# Patient Record
Sex: FEMALE | Race: WHITE | NOT HISPANIC OR LATINO | Employment: OTHER | URBAN - METROPOLITAN AREA
[De-identification: names, ages, dates, MRNs, and addresses within clinical notes are randomized per-mention and may not be internally consistent; named-entity substitution may affect disease eponyms.]

---

## 2017-01-24 NOTE — PATIENT DISCUSSION
1.  Continue with current treatment plan. Discussed importance of compliance. Will continue to monitor. 2.  Transplant Doing well after transplant. Continue topical steroids. Rejection and vaccinations reviewed. 3. Aphakia OS - stable4. Return for an appointment in 3 months for office call. with Dr. Jose Herring. 5.  1.  Transplant Doing well after transplant. Continue topical steroids. Rejection and vaccinations reviewed. 2. Primary Open Angle Glaucoma OS - Continue with current treatment plan. Discussed importance of compliance. Will continue to monitor. 3.  Aphakia OS - 4. Return for an appointment in 3 months for office call. with Dr. Jose Herring.

## 2017-04-25 NOTE — PATIENT DISCUSSION
1.  Discussed nodules recommend SK to remove order replacement BCL. Risks and benefits discussed including infection. 2. Transplant [facility] clean and disinfect CL3. Primary Open Angle Glaucoma OS - Continue with current treatment plan. Discussed importance of compliance. Will continue to monitor. Return for an appointment in 1 month for Minor Procedure. with Dr. Jeannie Linares. (will also be replacing BCL at that time this is a special order lens. )

## 2017-05-15 NOTE — PATIENT DISCUSSION
1. BCL placed on affected eye. Good fit. 2. SK performed without difficulty. POST op drops and instructions reviewedReturn for an appointment in 1 week for post op exam. with Dr. Lary Bray. 3.  OS CL information: [employer] 8.60 SPH PLANO DIAM 16. 0

## 2017-05-16 ENCOUNTER — FOLLOW UP (OUTPATIENT)
Dept: URBAN - METROPOLITAN AREA CLINIC 26 | Facility: CLINIC | Age: 67
End: 2017-05-16

## 2017-05-16 VITALS — DIASTOLIC BLOOD PRESSURE: 90 MMHG | SYSTOLIC BLOOD PRESSURE: 150 MMHG | HEIGHT: 55 IN | HEART RATE: 64 BPM

## 2017-05-16 DIAGNOSIS — H43.812: ICD-10-CM

## 2017-05-16 DIAGNOSIS — H33.001: ICD-10-CM

## 2017-05-16 DIAGNOSIS — H34.8312: ICD-10-CM

## 2017-05-16 DIAGNOSIS — H18.51: ICD-10-CM

## 2017-05-16 DIAGNOSIS — H44.23: ICD-10-CM

## 2017-05-16 DIAGNOSIS — H35.372: ICD-10-CM

## 2017-05-16 DIAGNOSIS — H25.13: ICD-10-CM

## 2017-05-16 DIAGNOSIS — H35.412: ICD-10-CM

## 2017-05-16 PROCEDURE — 1036F TOBACCO NON-USER: CPT

## 2017-05-16 PROCEDURE — 92014 COMPRE OPH EXAM EST PT 1/>: CPT

## 2017-05-16 PROCEDURE — G8427 DOCREV CUR MEDS BY ELIG CLIN: HCPCS

## 2017-05-16 PROCEDURE — G8417 CALC BMI ABV UP PARAM F/U: HCPCS

## 2017-05-16 PROCEDURE — 92250 FUNDUS PHOTOGRAPHY W/I&R: CPT

## 2017-05-16 ASSESSMENT — TONOMETRY
OS_IOP_MMHG: 13
OD_IOP_MMHG: 14

## 2017-05-16 ASSESSMENT — VISUAL ACUITY
OD_CC: 20/300
OS_CC: 20/40-2
OD_PH: 20/100

## 2017-05-22 NOTE — PATIENT DISCUSSION
Post Operative doing better. s/p SK  Polytrim qd PRED 3x/d other drops the same. Return for an appointment in 1 month for post op exam. with Dr. Rosita Calzada.

## 2017-06-23 NOTE — PATIENT DISCUSSION
Post Operative s/p SK doing well. Decrease PRED to 2x/d. Other drops the sameReturn for an appointment in 3 months for office call. clean CL with Dr. Parvin Castro.

## 2017-09-22 NOTE — PATIENT DISCUSSION
1.  Transplant Doing well after transplant. Continue topical steroids. Rejection and vaccinations reviewed. [facility] BCL clean. 2. Primary Open Angle Glaucoma OS - Continue with current treatment plan. Discussed importance of compliance. Will continue to monitor. f/u Dr VACA as scheduledReturn for an appointment in 3 months for office call. with Dr. Lary Bray.

## 2017-10-19 ENCOUNTER — FOLLOW UP (OUTPATIENT)
Dept: URBAN - METROPOLITAN AREA CLINIC 26 | Facility: CLINIC | Age: 67
End: 2017-10-19

## 2017-10-19 VITALS — HEART RATE: 60 BPM | DIASTOLIC BLOOD PRESSURE: 99 MMHG | HEIGHT: 55 IN | SYSTOLIC BLOOD PRESSURE: 144 MMHG

## 2017-10-19 DIAGNOSIS — H35.372: ICD-10-CM

## 2017-10-19 DIAGNOSIS — H35.412: ICD-10-CM

## 2017-10-19 DIAGNOSIS — H43.812: ICD-10-CM

## 2017-10-19 DIAGNOSIS — H34.8312: ICD-10-CM

## 2017-10-19 DIAGNOSIS — H44.23: ICD-10-CM

## 2017-10-19 DIAGNOSIS — H18.51: ICD-10-CM

## 2017-10-19 PROCEDURE — G8427 DOCREV CUR MEDS BY ELIG CLIN: HCPCS

## 2017-10-19 PROCEDURE — 1036F TOBACCO NON-USER: CPT

## 2017-10-19 PROCEDURE — 92134 CPTRZ OPH DX IMG PST SGM RTA: CPT

## 2017-10-19 PROCEDURE — 92250 FUNDUS PHOTOGRAPHY W/I&R: CPT

## 2017-10-19 PROCEDURE — 92226 OPHTHALMOSCOPY (SUB): CPT

## 2017-10-19 PROCEDURE — 92014 COMPRE OPH EXAM EST PT 1/>: CPT

## 2017-10-19 ASSESSMENT — VISUAL ACUITY
OS_PH: 20/30-2
OD_PH: 20/60
OD_CC: 20/200
OS_CC: 20/60

## 2017-10-19 ASSESSMENT — TONOMETRY
OD_IOP_MMHG: 12
OS_IOP_MMHG: 15

## 2017-12-26 NOTE — PATIENT DISCUSSION
1.  Transplant Doing well after transplant. Continue topical steroids. Rejection and vaccinations reviewed. [facility] BCL clean. 2. Primary Open Angle Glaucoma OS - Continue with current treatment plan. Discussed importance of compliance. Will continue to monitor. f/u Dr VACA as scheduledReturn for an appointment in 3 months for office call. with Dr. Aure Curiel.

## 2018-01-05 ENCOUNTER — IMPORTED ENCOUNTER (OUTPATIENT)
Dept: URBAN - METROPOLITAN AREA CLINIC 31 | Facility: CLINIC | Age: 68
End: 2018-01-05

## 2018-01-05 PROBLEM — H59.811: Noted: 2018-01-05

## 2018-01-05 PROBLEM — H25.812: Noted: 2018-01-05

## 2018-01-05 PROBLEM — H25.811: Noted: 2018-01-05

## 2018-01-05 PROCEDURE — 99214 OFFICE O/P EST MOD 30 MIN: CPT

## 2018-01-05 PROCEDURE — 92015 DETERMINE REFRACTIVE STATE: CPT

## 2018-01-05 NOTE — PATIENT DISCUSSION
1. Combined Types of Cataract OD: Discussed the risks benefits alternatives and limitations of cataract surgery including infection bleeding loss of vision retinal tears detachment. The patient stated a full understanding and a desire to proceed with the procedure in the right eye. Refractive options were reviewed. Patient has elected to be optimized for distance vision in the right eye. The patient will still need glasses for reading and to possibly fine tune distance vision. DC CL 1 week before admit risk of retinal tear detachment higher DF 1 week poPt wears glasses over CL she will still need to do postop will need CL OS for anisometropia explained with translator2. Combined Types of Cataract OS: Explained how cataracts can effect vision. Recommend clinical observation. The patient was advised to contact us if any change or worsening of vision. 3. Myopic degeneration4. H/O RD OD s/p repair - stableRD sx reviewed pt to call if new sx.5. Return for an appointment for [address] [place] [address] with Dr. Dana Garcia.

## 2018-02-06 ENCOUNTER — IMPORTED ENCOUNTER (OUTPATIENT)
Dept: URBAN - METROPOLITAN AREA CLINIC 31 | Facility: CLINIC | Age: 68
End: 2018-02-06

## 2018-02-06 PROBLEM — H25.11: Noted: 2018-02-06

## 2018-02-06 PROBLEM — H18.51: Noted: 2018-02-06

## 2018-02-06 PROCEDURE — 76519 ECHO EXAM OF EYE: CPT

## 2018-02-06 PROCEDURE — 92286 ANT SGM IMG I&R SPECLR MIC: CPT

## 2018-02-06 NOTE — PATIENT DISCUSSION
1.  Nuclear Sclerotic Cataract OD: Discussed the risks benefits alternatives and limitations of cataract surgery including infection bleeding loss of vision retinal tears detachment. The patient stated a full understanding and a desire to proceed with the procedure in the right eye. Refractive options were reviewed. Patient has elected to be optimized for distance vision in the right eye. The patient will still need glasses for reading and to treat astigmatism2. Fuchs Dystrophy OU: Recommend clinical observation. Advised use of Andres 128 drops in affected eye if worsening.

## 2018-02-15 ENCOUNTER — FOLLOW UP (OUTPATIENT)
Dept: URBAN - METROPOLITAN AREA CLINIC 26 | Facility: CLINIC | Age: 68
End: 2018-02-15

## 2018-02-15 VITALS — HEIGHT: 55 IN | DIASTOLIC BLOOD PRESSURE: 78 MMHG | HEART RATE: 60 BPM | SYSTOLIC BLOOD PRESSURE: 128 MMHG

## 2018-02-15 DIAGNOSIS — H35.372: ICD-10-CM

## 2018-02-15 DIAGNOSIS — H35.412: ICD-10-CM

## 2018-02-15 DIAGNOSIS — H44.23: ICD-10-CM

## 2018-02-15 DIAGNOSIS — H43.812: ICD-10-CM

## 2018-02-15 DIAGNOSIS — H44.2C1: ICD-10-CM

## 2018-02-15 DIAGNOSIS — H34.8312: ICD-10-CM

## 2018-02-15 DIAGNOSIS — H18.51: ICD-10-CM

## 2018-02-15 PROCEDURE — 92014 COMPRE OPH EXAM EST PT 1/>: CPT

## 2018-02-15 PROCEDURE — 92134 CPTRZ OPH DX IMG PST SGM RTA: CPT

## 2018-02-15 PROCEDURE — 92250 FUNDUS PHOTOGRAPHY W/I&R: CPT

## 2018-02-15 ASSESSMENT — VISUAL ACUITY
OS_CC: 20/50-2
OD_PH: 20/70-
OD_CC: 20/200
OS_PH: 20/30

## 2018-02-15 ASSESSMENT — TONOMETRY
OS_IOP_MMHG: 14
OD_IOP_MMHG: 12

## 2018-02-27 ENCOUNTER — IMPORTED ENCOUNTER (OUTPATIENT)
Dept: URBAN - METROPOLITAN AREA CLINIC 31 | Facility: CLINIC | Age: 68
End: 2018-02-27

## 2018-02-27 PROBLEM — Z96.1: Noted: 2018-02-27

## 2018-02-27 PROCEDURE — 99024 POSTOP FOLLOW-UP VISIT: CPT

## 2018-03-06 ENCOUNTER — IMPORTED ENCOUNTER (OUTPATIENT)
Dept: URBAN - METROPOLITAN AREA CLINIC 31 | Facility: CLINIC | Age: 68
End: 2018-03-06

## 2018-03-06 PROBLEM — Z96.1: Noted: 2018-03-06

## 2018-03-06 PROCEDURE — 99024 POSTOP FOLLOW-UP VISIT: CPT

## 2018-03-06 NOTE — PATIENT DISCUSSION
Post-Op Week #1 - Cataract Surgery Right Eye (OD) - Intraocular lens stable and surgery very well healed. Patient to resume all normal activities. Finish postop drops as directed. Final Refraction given if necessary. Call with any problems. F/U 4 mos CF. T/C cat Sx OS then.

## 2018-03-06 NOTE — PATIENT DISCUSSION
Pseudophakia OD - Surgery very well healed. Continue to use artificial tears as needed for ocular surface dryness. Finish po drops according to schedule. Final refraction given.

## 2018-03-27 NOTE — PATIENT DISCUSSION
1.  Transplant Doing well after transplant. Continue topical steroids. Rejection and vaccinations reviewed. [facility] BCL clean. 2. Primary Open Angle Glaucoma OS - Continue with current treatment plan. Discussed importance of compliance. Will continue to monitor. f/u Dr VACA as scheduledReturn for an appointment in 3 months for office call. with Dr. Jeannie Linares.

## 2018-04-12 ENCOUNTER — IMPORTED ENCOUNTER (OUTPATIENT)
Dept: URBAN - METROPOLITAN AREA CLINIC 31 | Facility: CLINIC | Age: 68
End: 2018-04-12

## 2018-04-12 PROBLEM — Z96.1: Noted: 2018-04-12

## 2018-04-12 PROBLEM — H25.12: Noted: 2018-04-12

## 2018-04-12 PROBLEM — H26.491: Noted: 2018-04-12

## 2018-04-12 PROCEDURE — 99024 POSTOP FOLLOW-UP VISIT: CPT

## 2018-04-12 NOTE — PATIENT DISCUSSION
PCO  OD (Posterior Capsule Opacification)  Not visually significant at this time. Monitor for yag capsulotomy necessity.

## 2018-04-12 NOTE — PATIENT DISCUSSION
1.  Post-Op Cataract Surgery 15-90 days Right Eye (OD):  Doing well with stable vision. 2.  Nuclear Sclerotic Cataract OS: Explained how cataracts can effect vision. Recommend clinical observation. The patient was advised to contact us if any change or worsening of vision. 3. Pseudophakia OD - IOL stable. Monitor. 4. PCO  OD (Posterior Capsule Opacification)  Not visually significant at this time. Monitor for yag capsulotomy necessity. 5. CE with Dr. Cholo Mei in 4 mos. T/C capsulotomy OD if worsens.

## 2018-04-12 NOTE — PATIENT DISCUSSION
Nuclear Sclerotic Cataract OS: Explained how cataracts can effect vision. Recommend clinical observation. The patient was advised to contact us if any change or worsening of vision.

## 2018-06-14 ENCOUNTER — FOLLOW UP (OUTPATIENT)
Dept: URBAN - METROPOLITAN AREA CLINIC 26 | Facility: CLINIC | Age: 68
End: 2018-06-14

## 2018-06-14 VITALS — HEIGHT: 55 IN | DIASTOLIC BLOOD PRESSURE: 98 MMHG | HEART RATE: 66 BPM | SYSTOLIC BLOOD PRESSURE: 136 MMHG

## 2018-06-14 DIAGNOSIS — H44.2C1: ICD-10-CM

## 2018-06-14 DIAGNOSIS — H43.812: ICD-10-CM

## 2018-06-14 DIAGNOSIS — H35.372: ICD-10-CM

## 2018-06-14 DIAGNOSIS — H34.8312: ICD-10-CM

## 2018-06-14 DIAGNOSIS — H44.23: ICD-10-CM

## 2018-06-14 DIAGNOSIS — H18.51: ICD-10-CM

## 2018-06-14 DIAGNOSIS — H35.412: ICD-10-CM

## 2018-06-14 PROCEDURE — 92014 COMPRE OPH EXAM EST PT 1/>: CPT

## 2018-06-14 PROCEDURE — 92250 FUNDUS PHOTOGRAPHY W/I&R: CPT

## 2018-06-14 ASSESSMENT — TONOMETRY
OD_IOP_MMHG: 18
OS_IOP_MMHG: 19

## 2018-06-14 ASSESSMENT — VISUAL ACUITY
OD_CC: 20/80-
OS_CC: 20/30

## 2018-06-21 ENCOUNTER — FOLLOW UP (OUTPATIENT)
Dept: URBAN - METROPOLITAN AREA CLINIC 26 | Facility: CLINIC | Age: 68
End: 2018-06-21

## 2018-06-21 VITALS — SYSTOLIC BLOOD PRESSURE: 140 MMHG | HEART RATE: 66 BPM | DIASTOLIC BLOOD PRESSURE: 96 MMHG | HEIGHT: 55 IN

## 2018-06-21 DIAGNOSIS — H35.412: ICD-10-CM

## 2018-06-21 DIAGNOSIS — H43.812: ICD-10-CM

## 2018-06-21 DIAGNOSIS — H34.8312: ICD-10-CM

## 2018-06-21 DIAGNOSIS — H44.23: ICD-10-CM

## 2018-06-21 DIAGNOSIS — H35.372: ICD-10-CM

## 2018-06-21 DIAGNOSIS — H18.51: ICD-10-CM

## 2018-06-21 DIAGNOSIS — H44.2C1: ICD-10-CM

## 2018-06-21 PROCEDURE — 92235 FLUORESCEIN ANGRPH MLTIFRAME: CPT

## 2018-06-21 PROCEDURE — 92250 FUNDUS PHOTOGRAPHY W/I&R: CPT

## 2018-06-21 PROCEDURE — 76512 OPH US DX B-SCAN: CPT

## 2018-06-21 PROCEDURE — 92012 INTRM OPH EXAM EST PATIENT: CPT

## 2018-06-21 ASSESSMENT — TONOMETRY
OD_IOP_MMHG: 14
OS_IOP_MMHG: 16

## 2018-06-21 ASSESSMENT — VISUAL ACUITY
OS_CC: 20/50+2
OS_PH: 20/30-2
OD_CC: 20/100+1

## 2018-06-21 NOTE — PATIENT DISCUSSION
FLUID MACULAR AREA , SEEMS SHALLOW, MAC OFF VS FLUID FROM BRVO, TO DR BLANKENSHIP FOR SECOND OPINION.

## 2018-06-27 NOTE — PATIENT DISCUSSION
1.  Transplant Doing well after transplant. Continue topical steroids. Rejection and vaccinations reviewed. [facility] BCL clean. Change to moxifloxacin qd alternate to Polytrim2. Primary Open Angle Glaucoma OS - Continue with current treatment plan. Discussed importance of compliance. Will continue to monitor. f/u Dr VACA as scheduledReturn for an appointment in 3 months for office call. with Dr. Melissa Mccormack.

## 2018-07-10 ENCOUNTER — IMPORTED ENCOUNTER (OUTPATIENT)
Dept: URBAN - METROPOLITAN AREA CLINIC 31 | Facility: CLINIC | Age: 68
End: 2018-07-10

## 2018-07-10 PROBLEM — H26.491: Noted: 2018-07-10

## 2018-07-10 PROBLEM — Z96.1: Noted: 2018-07-10

## 2018-07-10 PROBLEM — H20.9: Noted: 2018-07-10

## 2018-07-10 PROCEDURE — 99213 OFFICE O/P EST LOW 20 MIN: CPT

## 2018-07-10 NOTE — PATIENT DISCUSSION
1.  Pseudophakia OD - IOL stable. Monitor. 2.  Rebound Iritis OD - PF qid for 2 weeks tid for 2 weeks then bid for a mos then qd for a mos. 3. PCO  OD (Posterior Capsule Opacification)   PCO is visually significant and impairment of vision does not meet the patient’s functional needs or interferes with activities of daily living. Caps eval OD with Dr. Felipe Mckinney in two mos.

## 2018-07-10 NOTE — PATIENT DISCUSSION
PCO  OD (Posterior Capsule Opacification)   PCO is visually significant and impairment of vision does not meet the patient’s functional needs or interferes with activities of daily living. Risks benefits and alternatives to the Nd:YAG Laser reviewed including elevated IOP immediately postop and retinal tear/detachment. Patient to notify their ophthalmologist promptly if they have a significant change in symptoms such as flashes of light (photopsia) an increase in floaters loss of visual field or decrease in visual acuity after the procedure. Patient will be scheduled in [place] [id number] for Nd:YAG Laser.

## 2018-08-20 ENCOUNTER — IMPORTED ENCOUNTER (OUTPATIENT)
Dept: URBAN - METROPOLITAN AREA CLINIC 31 | Facility: CLINIC | Age: 68
End: 2018-08-20

## 2018-08-20 PROBLEM — H59.031: Noted: 2018-08-20

## 2018-08-20 PROBLEM — H26.491: Noted: 2018-08-20

## 2018-08-20 PROBLEM — H20.9: Noted: 2018-08-20

## 2018-08-20 PROCEDURE — 99213 OFFICE O/P EST LOW 20 MIN: CPT

## 2018-08-20 NOTE — PATIENT DISCUSSION
1.  Rebound Iritis OD - Taper PF to qd. 2.  PCO  OD (Posterior Capsule Opacification)  Not visually significant at this time. Monitor for yag capsulotomy necessity. Will need eventually. 3. Cystoid Macular Edema OD - Vit traction. Having Sx in 3 days.

## 2018-09-28 NOTE — PATIENT DISCUSSION
1.  Transplant Doing well after transplant. Continue topical steroids. Rejection and vaccinations reviewed. [facility] BCL clean. Change to moxifloxacin qd alternate to Polytrim2. Primary Open Angle Glaucoma OS - Continue with current treatment plan. Discussed importance of compliance. Will continue to monitor. f/u Dr VACA as scheduledClean lens every 6 weeks with tech see me every 3 months. Return for an appointment for office call. with Dr. Shon Shah.

## 2018-10-10 ENCOUNTER — IMPORTED ENCOUNTER (OUTPATIENT)
Dept: URBAN - METROPOLITAN AREA CLINIC 31 | Facility: CLINIC | Age: 68
End: 2018-10-10

## 2018-10-10 PROBLEM — H59.031: Noted: 2018-10-10

## 2018-10-10 PROBLEM — H20.9: Noted: 2018-10-10

## 2018-10-10 PROBLEM — H26.491: Noted: 2018-10-10

## 2018-10-10 PROCEDURE — 92250 FUNDUS PHOTOGRAPHY W/I&R: CPT

## 2018-10-10 PROCEDURE — 99213 OFFICE O/P EST LOW 20 MIN: CPT

## 2018-10-10 NOTE — PATIENT DISCUSSION
1.  Cystoid Macular Edema OD - 2. PCO  OD (Posterior Capsule Opacification)  Not visually significant at this time. Monitor for yag capsulotomy necessity. Caps eval after uveitis gone. 3.  Rebound Iritis OD - Discussed need to increase steroids as inflammation has returned. PF TID for a week then BID for a week then OC before any more taper.

## 2018-10-16 NOTE — PATIENT DISCUSSION
1.  Transplant Doing well after transplant. Continue topical steroids. Rejection and vaccinations reviewed. [facility] BCL replaced with new lens. Change to moxifloxacin qd alternate to Polytrim2. Primary Open Angle Glaucoma OS - Continue with current treatment plan. Discussed importance of compliance. Will continue to monitor. f/u Dr VACA as scheduledClean lens every 6 weeks with tech see me every 3 months. Return for an appointment for office call. with Dr. Brenda Katz. 3.  BCL placed on affected eye. Good fit.

## 2018-10-29 ENCOUNTER — IMPORTED ENCOUNTER (OUTPATIENT)
Dept: URBAN - METROPOLITAN AREA CLINIC 31 | Facility: CLINIC | Age: 68
End: 2018-10-29

## 2018-10-29 PROBLEM — H20.9: Noted: 2018-10-29

## 2018-10-29 PROBLEM — H26.491: Noted: 2018-10-29

## 2018-10-29 PROCEDURE — 92012 INTRM OPH EXAM EST PATIENT: CPT

## 2018-10-29 NOTE — PATIENT DISCUSSION
1.  Rebound Iritis OD - Continue taper. PF OD BID for a week then qd for a week. 2. PCO  OD (Posterior Capsule Opacification)  Caps eval OD with Dr. Radha Jones. S/P RD repair OD and Vitrectomy OD after.

## 2018-11-30 NOTE — PATIENT DISCUSSION
1.  Transplant Doing well after transplant. Continue topical steroids. Rejection and vaccinations reviewed. [place] KPRO BCL replaced with new lens. Vision better with Moxifloxacin2. Primary Open Angle Glaucoma OS - Continue with current treatment plan. Discussed importance of compliance. Will continue to monitor. f/u Dr VACA as scheduledReturn for an appointment in 6 weeks for office call. with Dr. Tonia Weldon.

## 2018-12-03 ENCOUNTER — IMPORTED ENCOUNTER (OUTPATIENT)
Dept: URBAN - METROPOLITAN AREA CLINIC 31 | Facility: CLINIC | Age: 68
End: 2018-12-03

## 2018-12-03 PROBLEM — H20.9: Noted: 2018-12-03

## 2018-12-03 PROCEDURE — 99213 OFFICE O/P EST LOW 20 MIN: CPT

## 2018-12-03 NOTE — PATIENT DISCUSSION
Rebound Iritis OD - Mild but increased pigment. PF QID for a week then TID for a week then BID for 2 weeks then qd until seen. Dr. Nieto Magoffin caps eval in a month. Post-op here to check acuity.

## 2019-01-02 ENCOUNTER — IMPORTED ENCOUNTER (OUTPATIENT)
Dept: URBAN - METROPOLITAN AREA CLINIC 31 | Facility: CLINIC | Age: 69
End: 2019-01-02

## 2019-01-02 PROBLEM — Z96.1: Noted: 2019-01-02

## 2019-01-02 PROBLEM — H26.491: Noted: 2019-01-02

## 2019-01-02 PROBLEM — H44.23: Noted: 2019-01-02

## 2019-01-02 PROCEDURE — 99214 OFFICE O/P EST MOD 30 MIN: CPT

## 2019-01-02 NOTE — PATIENT DISCUSSION
1.  PCO  OD (Posterior Capsule Opacification)   PCO is visually significant and impairment of vision does not meet the patient’s functional needs or interferes with activities of daily living. Risks benefits and alternatives to the Nd:YAG Laser reviewed including elevated IOP immediately postop and retinal tear/detachment. Patient to notify their ophthalmologist promptly if they have a significant change in symptoms such as flashes of light (photopsia) an increase in floaters loss of visual field or decrease in visual acuity after the procedure. Patient will be scheduled in [facility] for Nd:YAG Laser. 2. Pseudophakia OD - IOL stable. Monitor. 3. Myopic degeneration s/p membrane peel OD f/u retina as scheduled.

## 2019-01-22 ENCOUNTER — IMPORTED ENCOUNTER (OUTPATIENT)
Dept: URBAN - METROPOLITAN AREA CLINIC 31 | Facility: CLINIC | Age: 69
End: 2019-01-22

## 2019-01-22 PROBLEM — Z98.89: Noted: 2019-01-22

## 2019-01-22 PROBLEM — Z96.1: Noted: 2019-01-22

## 2019-01-22 PROBLEM — H18.51: Noted: 2019-01-22

## 2019-01-22 PROCEDURE — 92015 DETERMINE REFRACTIVE STATE: CPT

## 2019-01-22 PROCEDURE — 99024 POSTOP FOLLOW-UP VISIT: CPT

## 2019-01-22 NOTE — PATIENT DISCUSSION
Fuchs Dystrophy OU: Recommend clinical observation. Advised use of Andres 128 drops in affected eye if worsening.

## 2019-01-22 NOTE — PATIENT DISCUSSION
1.  s/p YAG laser capsulotomy for Posterior Capsule Opacification (PCO) in the Right Eye (OD). Please contact us if you have a significant change in symptoms such as flashes of light (photopsia) increased floaters decrease/loss of visual field or visual acuity. 2. Pseudophakia OU - IOLs stable. Monitor. 3. Fuchs Dystrophy OU: Recommend clinical observation. Advised use of Andres 128 drops in affected eye OD only at this time due to CL wear OS. if worsening.

## 2019-04-01 NOTE — PATIENT DISCUSSION
Bacterial Conjunctivitis OD --The condition was discussed with the patient. Moxifloxacin 3x/d for a week.

## 2019-04-01 NOTE — PATIENT DISCUSSION
1. Transplant Doing well after transplant. Continue topical steroids. Rejection and vaccinations reviewed. [facility] clean and disinfect. Vision better with Moxifloxacin2. Primary Open Angle Glaucoma OS - Continue with current treatment plan. Discussed importance of compliance. Will continue to monitor. f/u Dr VACA as scheduledReturn for an appointment in 2 months for office call. with Dr. Lary Bray. 3.  Bacterial Conjunctivitis OD --The condition was discussed with the patient. Moxifloxacin 3x/d for a week.

## 2019-06-05 NOTE — PATIENT DISCUSSION
1. Transplant Doing well after transplant. Continue topical steroids. Rejection and vaccinations reviewed. [facility] clean and disinfect. Vision better with Moxifloxacin2. Primary Open Angle Glaucoma OS - Continue with current treatment plan. Discussed importance of compliance. Will continue to monitor. f/u Dr VACA as scheduledReturn for an appointment in 2 months for office call. with Dr. Nery Cartagena.

## 2019-07-24 ENCOUNTER — IMPORTED ENCOUNTER (OUTPATIENT)
Dept: URBAN - METROPOLITAN AREA CLINIC 31 | Facility: CLINIC | Age: 69
End: 2019-07-24

## 2019-08-02 ENCOUNTER — IMPORTED ENCOUNTER (OUTPATIENT)
Dept: URBAN - METROPOLITAN AREA CLINIC 31 | Facility: CLINIC | Age: 69
End: 2019-08-02

## 2019-08-02 PROBLEM — H18.51: Noted: 2019-08-02

## 2019-08-02 PROCEDURE — 99214 OFFICE O/P EST MOD 30 MIN: CPT

## 2019-08-07 NOTE — PATIENT DISCUSSION
1. Transplant Doing well after transplant. Continue topical steroids. Rejection and vaccinations reviewed. [facility] clean and disinfect. Continue prophylactic Moxifloxacin2. Primary Open Angle Glaucoma OS - Continue with current treatment plan. Discussed importance of compliance. Will continue to monitor. f/u Dr VACA as scheduledReturn for an appointment in 3 months for office call. with Dr. Caprice Giraldo.

## 2019-11-12 NOTE — PATIENT DISCUSSION
Transplant Doing well after transplant. Continue topical steroids. Continue BCL and prophylactic topical antibiotics. Rejection and vaccinations reviewed.

## 2019-11-12 NOTE — PATIENT DISCUSSION
Primary Open Angle Glaucoma OS Severe - IOP excellent today. SEES Dr Brad Brown as well. Continue with current treatment plan. Discussed importance of compliance. Will continue to monitor.

## 2019-11-12 NOTE — PATIENT DISCUSSION
Return for an appointment in 3 months for office call. clean and disinfect CL with Dr. Alexander Henriquez.

## 2019-12-12 ENCOUNTER — IMPORTED ENCOUNTER (OUTPATIENT)
Dept: URBAN - METROPOLITAN AREA CLINIC 31 | Facility: CLINIC | Age: 69
End: 2019-12-12

## 2019-12-12 PROBLEM — Z96.1: Noted: 2019-12-12

## 2019-12-12 PROBLEM — H35.3132: Noted: 2019-12-12

## 2019-12-12 PROCEDURE — 92250 FUNDUS PHOTOGRAPHY W/I&R: CPT

## 2019-12-12 PROCEDURE — 99214 OFFICE O/P EST MOD 30 MIN: CPT

## 2019-12-12 NOTE — PATIENT DISCUSSION
1.  Pseudophakia OU - IOLs stable. Monitorfor changes in vision. 2. ARMD OU dry - Importance of smoking cessation blood pressure control and healthy diet were emphasized. In accordance with the AREDS study a good multivitamin containing EC and Zinc were recommened to be taken daily. Patient was instructed to self monitor their monocular vision (reading/Amsler Grid) at least weekly. Patient should immediately report any new onset of decreased vision or metamorphopsia. 3.   Six mos CE.

## 2020-02-14 NOTE — PATIENT DISCUSSION
Primary Open Angle Glaucoma OS Severe -  Continue with current treatment plan. Discussed importance of compliance. Will continue to monitor for stability or progression.

## 2020-02-14 NOTE — PATIENT DISCUSSION
1.  Transplant Doing well after transplant. CPM risk of infection discussed recommend check up every 6 weeks pt refuses Rejection and vaccinations reviewed. 2. Primary Open Angle Glaucoma OS Severe -  Continue with current treatment plan. Discussed importance of compliance. Will continue to monitor for stability or progression. 3.  Aphakia OS - stable monitor4. Return for an appointment in 3 months for office call. with Dr. Tanisha Fleming.

## 2020-05-04 NOTE — PATIENT DISCUSSION
Primary Open Angle Glaucoma OS Severe -  Exposed tube shunt. Consult Dr Vani Cordoba for evaluation. Discussed importance of compliance. Will continue to monitor for stability or progression.

## 2020-05-04 NOTE — PATIENT DISCUSSION
1.  Transplant Doing well after [place] K PRO transplant. Continue topical steroids. Rejection and vaccinations reviewed. 2. Primary Open Angle Glaucoma OS Severe -  Exposed tube shunt. Consult Dr Alba Dudley for evaluation. Discussed importance of compliance. Will continue to monitor for stability or progression. Increase Moxifloxacin to 4x/d3. Aphakia OS -Return for an appointment for office call. with Dr. Cyril Carmichael.

## 2020-05-12 NOTE — PATIENT DISCUSSION
1.  Primary Open Angle Glaucoma OS Severe - s/p wound repair with DR VACA f/u with him as scheduled. Discussed importance of compliance. Will continue to monitor for stability or progression. 2.  Transplant Doing well after transplant. Continue topical steroids. NEW BCL placed. Rejection and vaccinations reviewed. 3. Aphakia OS - stable4. Return for an appointment in 2 weeks for office call. with Dr. Tonia Weldon.

## 2020-05-12 NOTE — PATIENT DISCUSSION
Transplant Doing well after transplant. Continue topical steroids. NEW BCL placed. Rejection and vaccinations reviewed.

## 2020-05-26 NOTE — PATIENT DISCUSSION
Transplant stable will continue to use smaller BCL which will hopefully not rub on Conjunctiva superior. Continue topical steroids. Moxi qd.   Replace BCL monthly Accuvue Oasys plano 8.8

## 2020-05-26 NOTE — PATIENT DISCUSSION
Primary Open Angle Glaucoma OS Severe -  f/u Dr VACA s/p wound revision OS Can taper PRED to 2x/d but then needs to stay for transplant Moxifloxacin chronic qd  Discussed importance of compliance. Will continue to monitor for stability or progression.

## 2020-07-01 NOTE — PATIENT DISCUSSION
1.  Transplant Doing well after transplant. Continue topical steroids. Rejection and vaccinations reviewed. 2. Primary Open Angle Glaucoma OS Severe -  Continue with current treatment plan. Discussed importance of compliance. Will continue to monitor for stability or progression. 3.  Aphakia OS - 4. BCL placed on affected eye. Good fit. 5. Return for an appointment in 6 weeks for office call. with Dr. Ambrocio Torres.

## 2020-07-24 NOTE — PATIENT DISCUSSION
1.  Vitreous Hemorrhage OS - New spontaneous no view through [facility] recommend Bscan retina r/o RD Increase PRED 4x/d2. Transplant s/p [facility] Doing well after transplant. Continue topical steroids. Rejection and vaccinations reviewed. 3. Primary Open Angle Glaucoma OS Severe -  Continue with current treatment plan. Discussed importance of compliance. Will continue to monitor for stability or progression. 4.  Chronic iritis Continue topical steroids as directed. 5. Aphakia OS - stableReturn for an appointment in 1 week for office call. with Dr. Camilla Crigler.

## 2020-07-24 NOTE — PATIENT DISCUSSION
Transplant s/p [facility] Doing well after transplant. Continue topical steroids. Rejection and vaccinations reviewed.

## 2020-08-03 NOTE — PATIENT DISCUSSION
1.  Vitreous Hemorrhage OS - No RD per Dr Kendy Arriaga continue PRED 4x/d. F/u retina as scheduled. 2. Transplant s/p [facility] Doing well after transplant. Continue topical steroids. Rejection and vaccinations reviewed. Continue Chronic BCL. Will change next visit. 3. Primary Open Angle Glaucoma OS Severe -  Continue with current treatment plan. Discussed importance of compliance. Will continue to monitor for stability or progression. 4.  Chronic iritis Continue topical steroids as directed. 5. Aphakia OS - stableReturn for an appointment in 3 weeks for office call. with Dr. Vishal Molina.

## 2020-08-05 NOTE — PATIENT DISCUSSION
PROSTHESIS IN PLACE WITH ABUNDANT DISCHARGE. REMOVED THE PROSTHESIS AND PLACE IT BACK AFTER CLEANING THE SOCKET. RECOMMEND FREQUENT REMOVAL AND CLEANING. OPTIONAL ERYTHROMYCIN PRN.

## 2020-08-05 NOTE — PATIENT DISCUSSION
8/5/20: TRACE HEME SEEN ON EXAM. REC SLEEPING HOB ELEVATED. PT WOULD LIKE TO AVOID SURGICAL PROCEDURES AND I AGREE ITS BETTER TO MINIMIZE THE RISKS IN HIS ONLY SEEING EYE. WE'LL TRY CONSERVATIVE MEASURES FIRST. PT AND WIFE VOICED UNDERSTANDING.

## 2020-08-05 NOTE — PATIENT DISCUSSION
[birth year]/1[birth year]: FREQUENT COMPLICATION IN PATIENTS WITH [place] K-PRO. MIGHT BE AMENABLE TO YAG LASER. WOULD LIKE DR HUSSEIN TO TRY THIS BEFORE CONSIDERING SURGERY IN THIS HIGH RISK EYE. IF YAG IS NOT SUCCESSFUL, WE MAY ATTEMPT PERIOCULAR INJ OF STEROIDS TO REDUCE INFLAMMATION TO EVAL IF VA CAN IMPROVE.

## 2020-08-25 NOTE — PATIENT DISCUSSION
1.  Transplant Doing well after transplant. Continue topical steroids. Rejection and vaccinations reviewed. replace BCL Accuvue oasys 8. 82. Vitreous Hemorrhage OS - resolving f/u DR Tony Kim as scheduled3. Aphakia OS - stable4. Return for an appointment in 6 weeks for office call. with Dr. Hong Pod.

## 2020-10-02 NOTE — PATIENT DISCUSSION
Primary open angle glaucoma OS moderate-  Continue with current treatment plan. Discussed importance of compliance. Will continue to monitor for stability or progression.

## 2020-10-02 NOTE — PATIENT DISCUSSION
1.  Transplant Doing well after transplant. Continue topical steroids. Rejection and vaccinations reviewed. BCL replaced AV Oasys 8.8 PL. 2. Aphakia OS -  stable3. Primary open angle glaucoma OS moderate-  Continue with current treatment plan. Discussed importance of compliance. Will continue to monitor for stability or progression. 4. Vitreous Hemorrhage OS -  f/u Dr Cynthia Holley as scheduled. 5. Return for an appointment in 6 weeks for office call. with Dr. Marlon Wu.

## 2020-11-06 NOTE — PATIENT DISCUSSION
1.  Transplant Doing well after transplant. Continue topical steroids. Rejection and vaccinations reviewed. BCL replaced AV Oasys 8.8 PL. 2. Aphakia OS -  stable3. Primary open angle glaucoma OS moderate-  Continue with current treatment plan. Discussed importance of compliance. Will continue to monitor for stability or progression. 4. Vitreous Hemorrhage OS -  f/u Dr Ebony Jiménez as scheduled. 5. Return for an appointment in 6 weeks for office call. with Dr. Camilla Crigler. 6.  BCL placed on affected eye. Good fit.

## 2021-01-20 NOTE — PATIENT DISCUSSION
1.  Transplant Doing well after transplant. Continue topical steroids. Rejection and vaccinations reviewed. BCL replaced AV Oasys 8.8 PL. 2. Aphakia OS -  stable3. Primary open angle glaucoma OS moderate-  Continue with current treatment plan. Discussed importance of compliance. Will continue to monitor for stability or progression. Sees  B4. Vitreous Hemorrhage OS -  Resolved f/u Dr Bettie Jorge as scheduled. 5. BCL placed on affected eye. Good fit. Return for an appointment in 6 weeks for office call. with Dr. Tanisha Fleming.

## 2021-03-03 NOTE — PATIENT DISCUSSION
1.  Transplant Doing well after transplant. Continue topical steroids. Rejection and vaccinations reviewed. BCL replaced Air optic N&D2. Aphakia OS -  stable3. Primary open angle glaucoma OS moderate-  Continue with current treatment plan. Discussed importance of compliance. Will continue to monitor for stability or progression. Sees  B4. Vitreous Hemorrhage OS -  Resolved f/u Dr Ame Candelaria as scheduled. 5. BCL placed on affected eye. Good fit. Return for an appointment in 1 month for office call. with Dr. Jt Desai.

## 2021-04-07 NOTE — PATIENT DISCUSSION
Transplant Doing well after transplant. CPM needs Chronic BCL with KPRO because epithelium does not cover the PMMA Optic risk of melt and infection without CL. Rejection and vaccinations reviewed. Return for an appointment for office call. with Dr. Davey To.

## 2021-04-07 NOTE — PATIENT DISCUSSION
1.  Primary Open Angle Glaucoma OS Severe -  Recurrent exposure of plate with risk of infection increase Moxi to 4x/d. Consult Dr VACA ?replace tube shunt. Discussed importance of compliance. Will continue to monitor for stability or progression. 2.  Transplant Doing well after transplant. CPM needs Chronic BCL with KPRO because epithelium does not cover the PMMA Optic risk of melt and infection without CL. Rejection and vaccinations reviewed. Return for an appointment for office call. with Dr. Denver Curb.

## 2022-04-02 ASSESSMENT — VISUAL ACUITY
OS_SC: 20/40
OS_SC: 20/30-1
OS_SC: 20/30-2
OD_CC: J515''
OD_SC: 20/40-2
OD_PH: CC 20/50
OD_SC: 20/30-2
OD_SC: 20/40-1
OS_SC: 20/40-1
OS_SC: 20/30-3
OS_SC: 20/30-2
OS_SC: 20/40+2
OD_SC: 20/100+2
OD_CC: 20/50
OS_SC: 20/40
OS_CC: 20/30-1
OD_SC: 20/40
OD_SC: 20/40-2
OS_SC: 20/40
OD_SC: 20/40-3
OS_SC: 20/40
OD_SC: 20/100
OD_SC: 20/200
OD_SC: 20/50-3
OD_CC: 20/40
OS_SC: 20/40
OD_PH: SC 20/40
OD_SC: 20/30-3
OS_CC: J215''
OS_SC: 20/30+1
OD_CC: 20/70+1

## 2022-04-02 ASSESSMENT — KERATOMETRY
OD_AXISANGLE_DEGREES: 113
OD_K1POWER_DIOPTERS: 42.22
OD_K2POWER_DIOPTERS: 42.00
OD_AXISANGLE2_DEGREES: 023
OS_K2POWER_DIOPTERS: 33.75
OS_K1POWER_DIOPTERS: 42.52
OD_AXISANGLE_DEGREES: 025
OS_K1POWER_DIOPTERS: 34.50
OD_AXISANGLE2_DEGREES: 115
OS_AXISANGLE_DEGREES: 165
OS_AXISANGLE2_DEGREES: 075
OD_K1POWER_DIOPTERS: 43.25
OS_K2POWER_DIOPTERS: 43.73
OS_AXISANGLE2_DEGREES: 003
OS_AXISANGLE_DEGREES: 093
OD_K2POWER_DIOPTERS: 43.69

## 2022-04-02 ASSESSMENT — TONOMETRY
OD_IOP_MMHG: 15
OD_IOP_MMHG: 16
OD_IOP_MMHG: 14
OS_IOP_MMHG: 17
OS_IOP_MMHG: 11
OS_IOP_MMHG: 14
OD_IOP_MMHG: 19
OD_IOP_MMHG: 16
OD_IOP_MMHG: 18
OD_IOP_MMHG: 20
OS_IOP_MMHG: 16
OD_IOP_MMHG: 17

## 2022-05-12 NOTE — PATIENT DISCUSSION
Pt had episodes 10 years ago when they were strengthening OD, pt will monitor and if worsening see Dr Christian Garcia, but likely longstanding due to EOM imbalance.

## 2023-12-05 ENCOUNTER — NEW PATIENT (OUTPATIENT)
Dept: URBAN - METROPOLITAN AREA CLINIC 31 | Facility: CLINIC | Age: 73
End: 2023-12-05

## 2023-12-05 DIAGNOSIS — Z96.1: ICD-10-CM

## 2023-12-05 PROCEDURE — 92002 INTRM OPH EXAM NEW PATIENT: CPT

## 2023-12-05 ASSESSMENT — VISUAL ACUITY
OD_CC: J16
OD_CC: 20/100
OS_CC: 20/30
OS_CC: J5

## 2023-12-05 ASSESSMENT — TONOMETRY
OD_IOP_MMHG: 15
OS_IOP_MMHG: 15

## 2024-04-18 ENCOUNTER — NEW PATIENT (OUTPATIENT)
Dept: URBAN - METROPOLITAN AREA CLINIC 26 | Facility: CLINIC | Age: 74
End: 2024-04-18

## 2024-04-18 VITALS
BODY MASS INDEX: 27.47 KG/M2 | HEART RATE: 68 BPM | DIASTOLIC BLOOD PRESSURE: 103 MMHG | HEIGHT: 67 IN | SYSTOLIC BLOOD PRESSURE: 143 MMHG | WEIGHT: 175 LBS

## 2024-04-18 DIAGNOSIS — H35.373: ICD-10-CM

## 2024-04-18 DIAGNOSIS — H04.123: ICD-10-CM

## 2024-04-18 DIAGNOSIS — H02.831: ICD-10-CM

## 2024-04-18 DIAGNOSIS — H25.12: ICD-10-CM

## 2024-04-18 DIAGNOSIS — H44.2C1: ICD-10-CM

## 2024-04-18 DIAGNOSIS — H44.23: ICD-10-CM

## 2024-04-18 DIAGNOSIS — H02.834: ICD-10-CM

## 2024-04-18 PROCEDURE — 92250 FUNDUS PHOTOGRAPHY W/I&R: CPT | Mod: 59

## 2024-04-18 PROCEDURE — 92134 CPTRZ OPH DX IMG PST SGM RTA: CPT

## 2024-04-18 PROCEDURE — 92004 COMPRE OPH EXAM NEW PT 1/>: CPT

## 2024-04-18 ASSESSMENT — TONOMETRY
OD_IOP_MMHG: 22
OS_IOP_MMHG: 15

## 2024-04-18 ASSESSMENT — VISUAL ACUITY
OD_PH: 20/70-1
OD_SC: 20/200
OS_CC: 20/60-2
OS_SC: CF 5FT
OD_CC: 20/80-2
OS_PH: 20/50

## 2025-02-04 ENCOUNTER — COMPREHENSIVE EXAM (OUTPATIENT)
Age: 75
End: 2025-02-04

## 2025-02-04 DIAGNOSIS — H04.123: ICD-10-CM

## 2025-02-04 DIAGNOSIS — Z96.1: ICD-10-CM

## 2025-02-04 DIAGNOSIS — H43.392: ICD-10-CM

## 2025-02-04 DIAGNOSIS — Z98.890: ICD-10-CM

## 2025-02-04 DIAGNOSIS — H35.362: ICD-10-CM

## 2025-02-04 DIAGNOSIS — H52.12: ICD-10-CM

## 2025-02-04 DIAGNOSIS — H44.23: ICD-10-CM

## 2025-02-04 PROCEDURE — 92015 DETERMINE REFRACTIVE STATE: CPT

## 2025-02-04 PROCEDURE — 92014 COMPRE OPH EXAM EST PT 1/>: CPT

## 2025-02-04 PROCEDURE — 92250 FUNDUS PHOTOGRAPHY W/I&R: CPT
